# Patient Record
Sex: MALE | Race: BLACK OR AFRICAN AMERICAN | NOT HISPANIC OR LATINO | Employment: OTHER | URBAN - METROPOLITAN AREA
[De-identification: names, ages, dates, MRNs, and addresses within clinical notes are randomized per-mention and may not be internally consistent; named-entity substitution may affect disease eponyms.]

---

## 2023-01-13 PROBLEM — C44.40 CANCER OF SKIN OF SCALP: Status: ACTIVE | Noted: 2023-01-13

## 2023-01-16 ENCOUNTER — NURSE TRIAGE (OUTPATIENT)
Dept: ADMINISTRATIVE | Facility: CLINIC | Age: 68
End: 2023-01-16

## 2023-01-16 NOTE — TELEPHONE ENCOUNTER
"Pt's sister initially calls on behalf of pt and states, "Some of the gauze from his dressing is stuck under his staples and I'm not sure what to do. We cut out as much as we good." She notes some "bloody spotting" at the incision site, but denies any active bleeding or s/s of infection.     Care Advice recommends reaching out to OCP. OCP, Dr. Cheung advises that she will call pt's sibling back directly.    Pt's sibling verbalizes understanding and is instructed to call back if pt develops any new/worsening sxs, questions, or concerns.   Reason for Disposition   [1] Caller has URGENT question AND [2] triager unable to answer question    Additional Information   Negative: [1] Major abdominal surgical incision AND [2] wound gaping open AND [3] visible internal organs   Negative: Sounds like a life-threatening emergency to the triager   Negative: [1] Bleeding from incision AND [2] won't stop after 10 minutes of direct pressure   Negative: [1] Widespread rash AND [2] bright red, sunburn-like   Negative: Severe pain in the incision   Negative: [1] Suture came out early AND [2] wound gaping AND [3] < 48 hours since sutures placed   Negative: [1] Incision gaping open AND [2] length of opening > 2 inches (5 cm)   Negative: Patient sounds very sick or weak to the triager   Negative: Sounds like a serious complication to the triager   Negative: Fever > 100.5 F (38.1 C)   Negative: [1] Incision looks infected (spreading redness, pain) AND [2] fever > 99.5 F (37.5 C)   Negative: [1] Incision looks infected (spreading redness, pain) AND [2] large red area (> 2 in. or 5 cm)   Negative: [1] Incision looks infected (spreading redness, pain) AND [2] face wound   Negative: [1] Red streak runs from the incision AND [2] longer than 1 inch (2.5 cm)   Negative: [1] Pus or bad-smelling fluid draining from incision AND [2] no fever   Negative: [1] Post-op pain AND [2] not controlled with pain medications   Negative: Dressing soaked with blood " or body fluid (e.g., drainage)    Protocols used: Post-Op Incision Symptoms-A-[facility]

## 2023-01-21 ENCOUNTER — TELEPHONE (OUTPATIENT)
Dept: ADMINISTRATIVE | Facility: CLINIC | Age: 68
End: 2023-01-21

## 2023-01-21 NOTE — TELEPHONE ENCOUNTER
Spoke to pts sister who states she has questions about surgery site suture care. Nurse referred pt to discharge instructions for care of surgery site. Sister states they were not provided care directions. Nurse provided pre admission discharge directions call [phone] and ask for the ENT resident on call.

## 2023-04-25 ENCOUNTER — PATIENT MESSAGE (OUTPATIENT)
Dept: RESEARCH | Facility: HOSPITAL | Age: 68
End: 2023-04-25

## 2023-05-02 ENCOUNTER — PATIENT MESSAGE (OUTPATIENT)
Dept: RESEARCH | Facility: HOSPITAL | Age: 68
End: 2023-05-02

## 2024-11-03 PROBLEM — R51.9 FACIAL PAIN: Status: ACTIVE | Noted: 2024-11-03

## 2024-11-03 PROBLEM — R22.0 FACIAL SWELLING: Status: ACTIVE | Noted: 2024-11-03

## 2024-11-03 PROBLEM — C07 CARCINOMA OF PAROTID GLAND: Status: ACTIVE | Noted: 2024-11-03

## 2024-11-07 ENCOUNTER — NURSE TRIAGE (OUTPATIENT)
Dept: ADMINISTRATIVE | Facility: CLINIC | Age: 69
End: 2024-11-07

## 2024-11-07 NOTE — TELEPHONE ENCOUNTER
"[facility] Plan of Care Note    Referral source: Nurse On-Call      Reason for consult: med refill:"  Sister requesting refill for hydrocodone-acetaminophen (HYCET) solution 7.5-325 mg/15mL. Pt has pain to jaw, neck and behind ears. Pt has hx of cancer, Wants enough to last until appt on Monday.   "      Disposition recommended: Primary Care      Additional Recommendations: 69m with head and neck ca requesting refill of hycet. Will refer back to PCP or ENT.     "

## 2024-11-07 NOTE — TELEPHONE ENCOUNTER
Sister requesting refill for hydrocodone-acetaminophen (HYCET) solution 7.5-325 mg/15mL as ordered. Pt has pain to jaw, neck and behind ears. Pt has hx of cancer, Wants enough to last until appt on Monday. Care advise discuss with PCP and Callback today. Secure chat sent to [facility]- Dr. Ketty Leal per protocol. [facility] unable to refill Controlled med. Advised to receive callback per from office today per protocol. VU. Encounter routed to provider for f/u.   Reason for Disposition   Caller requesting a CONTROLLED substance prescription refill (e.g., narcotics, ADHD medicines)    Protocols used: Medication Refill and Renewal Call-A-[facility]